# Patient Record
(demographics unavailable — no encounter records)

---

## 2017-05-04 NOTE — NUR
pt shaka ramos home for c/o LLQ abd pain w/ nausea, no vomiting x today, report 
drinking 1 bottle of champagne today and has been drinking 1 bottle of 
champagne TIW x2 mos, denies any diarrhea, lbm today normal, lmp x4/11/17. 
AOx4, afebrile w/ resp even & unlabored, VSS w/ nad noted. pt in gown, on 
continuous monitoring. Urine obtained & sent to lab. Pending further darrick ramos MD.

## 2017-05-04 NOTE — NUR
pt lying in bed w/ resp even & unlabored, IV flds continue to be infusing w/ 
IVHL patent & intact, nad noted. On continuous monitoring.

## 2017-05-04 NOTE — NUR
Patient ambulatory w/ steady gait, resp even & unlabored, denies any pain w/ 
nad noted. pt mom at bedside. IV removed. Catheter intact and site benign. 
Pressure and 4x4 applied to site. No bleeding noted.Patient discharged to home 
in stable condition. Written and verbal after care instructions given. Patient 
verbalizes understanding of instruction.

## 2017-05-04 NOTE — NUR
pt refusing pepcid and reglan at this time. states continues to have lower abd 
pain, but tolerable at this time. Dr. Hammond notified.

## 2017-05-30 NOTE — NUR
Patient discharged to home in stable condition. Written and verbal after care 
instructions given. Patient verbalizes understanding of instruction.IV removed. 
Catheter intact and site benign. Pressure and 4x4 applied to site. No bleeding 
noted.

## 2017-05-30 NOTE — NUR
pt bibra to er bed 15. c/o headache and diffuse abdominal pain from drinking 
vodka yesterday.  denies n/v. awaiting md hollingsworth.

## 2017-08-27 NOTE — NUR
Patient discharged to home in stable condition. Written and verbal after care 
instructions given. Patient verbalizes understanding of instruction. ambulatory 
with a steady gait noted. pt aaox4 no acute distress noted, resp even and 
unlabored.